# Patient Record
Sex: FEMALE | Race: WHITE | NOT HISPANIC OR LATINO | Employment: UNEMPLOYED | URBAN - METROPOLITAN AREA
[De-identification: names, ages, dates, MRNs, and addresses within clinical notes are randomized per-mention and may not be internally consistent; named-entity substitution may affect disease eponyms.]

---

## 2017-03-21 ENCOUNTER — HOSPITAL ENCOUNTER (OUTPATIENT)
Dept: MAMMOGRAPHY | Facility: HOSPITAL | Age: 47
Discharge: HOME | End: 2017-03-21
Payer: COMMERCIAL

## 2017-03-21 DIAGNOSIS — Z12.31 ENCOUNTER FOR SCREENING MAMMOGRAM FOR MALIGNANT NEOPLASM OF BREAST: ICD-10-CM

## 2017-03-21 PROCEDURE — G0202 SCR MAMMO BI INCL CAD: HCPCS

## 2017-08-28 ENCOUNTER — ALLSCRIPTS OFFICE VISIT (OUTPATIENT)
Dept: OTHER | Facility: OTHER | Age: 47
End: 2017-08-28

## 2018-01-13 VITALS
BODY MASS INDEX: 39.27 KG/M2 | SYSTOLIC BLOOD PRESSURE: 138 MMHG | HEIGHT: 60 IN | WEIGHT: 200 LBS | DIASTOLIC BLOOD PRESSURE: 82 MMHG

## 2018-01-15 NOTE — MISCELLANEOUS
Message   Recorded as Task   Date: 10/14/2016 12:28 PM, Created By: Hafsa Sloan   Task Name: Call Back   Assigned To: [facility],Team   Regarding Patient: Luis Ellison, Status: In Progress   Darlene Hinkle - 14 Oct 2016 12:28 PM     TASK CREATED  Caller: Self; [phone] (Home)  pt called - she said her periods have been abnormal and havent been coming the same time every month  she was told by dr Margarita Gay to call if she noticed a change in her cycle  please advise [phone]   [facility] - 14 Oct 2016 12:33 PM     TASK IN PROGRESS   Yane Simmons - 14 Oct 2016 12:39 PM     TASK EDITED   spoke with pt    cycles have been irreg, but over 21 days    his month was 24    adv to keep a calendar and to call if < 21 days        Active Problems    1  Dense breasts (793 82) (R92 2)   2  Encounter for gynecological examination without abnormal finding (V72 31) (Z01 419)   3  Encounter for routine gynecological examination (V72 31) (Z01 419)   4  Encounter for screening mammogram for malignant neoplasm of breast (V76 12)   (Z12 31)   5  Screening for human papillomavirus (HPV) (V73 81) (Z11 51)    Current Meds   1  Janumet  MG Oral Tablet; TAKE 1 TABLET DAILY WITH FOOD; Therapy: (Recorded:[id number]) to Recorded   2  Lisinopril-Hydrochlorothiazide 20-12 5 MG Oral Tablet; TAKE 1 TABLET DAILY; Therapy: (Recorded:[id number]) to Recorded   3  Pravastatin Sodium 10 MG Oral Tablet; TAKE 1 TABLET DAILY; Therapy: (Recorded:[id number]) to Recorded   4  Protonix 40 MG Oral Tablet Delayed Release (Pantoprazole Sodium); TAKE 1 TABLET   DAILY; Therapy: (Recorded:[id number]) to Recorded    Allergies    1   No Known Drug Allergies    Signatures   Electronically signed by : Lorraine Dalal, ; Oct 14 2016 12:39PM EST                       (Author)

## 2018-01-16 NOTE — PROGRESS NOTES
Assessment    1  Encounter for gynecological examination without abnormal finding (V72 31) (Z01 419)    Plan  Encounter for screening mammogram for malignant neoplasm of breast    · * MAMMO SCREENING BILATERAL W CAD; Status:Hold For - Scheduling; Requested  for:05Mar2017;    Perform:[facility]; WFL:[id number]; Ordered;  For:Encounter for screening mammogram for malignant neoplasm of breast; Ordered By:Hasmukh Gordon;    Discussion/Summary  health maintenance visit Currently, she eats an adequate diet and has an adequate exercise regimen  cervical cancer screening is needed every three years next cervical cancer screening is due 2018 Breast cancer screening: monthly self breast exam was advised, mammogram has been ordered and mammogram is needed every year  Advice and education were given regarding weight bearing exercise and reproductive health  Patient discussion: discussed with the patient  Annual examination was completed  Mammogram was ordered  We discussed patient's calling me after her menses if she still has some pelvic cramping as of in one to order a pelvic ultrasound  Patient otherwise to return to the office in one year  Chief Complaint  annual gyn exam  recent pelvic cramp      History of Present Illness  HPI: Patient returns for annual GYN visit  She has no new medical or surgical issues  She did note in this last few days she has had some increased pelvic cramping  She notes that she is due for her menses  She has done well with an exercise routine and has done very well with control of her diabetes  GYN HM, Adult Female [facility]: The patient is being seen for a gynecology evaluation  The last health maintenance visit was 1 year(s) ago  General Health:   Lifestyle:  She consumes a diverse and healthy diet  She does not have any weight concerns  She exercises regularly  She exercises 3 or more times per week  Exercise includes walking   She does not use tobacco  The patient has never smoked cigarettes  She consumes alcohol  She reports occasional alcohol use  Reproductive health: the patient is premenopausal   she reports no menstrual problems  she uses contraception  For contraception, she uses natural family planning  she is sexually active  She is monogamous with a male partner  Screening: Cervical cancer screening includes a pap smear performed 8/5/15 and human papilloma virus screening performed 8/5/15  Breast cancer screening includes a mammogram performed 3/9/16 and irregular breast self-exams performed  Colorectal cancer screening includes no previous colonoscopy  Review of Systems    Constitutional: No fever, no chills, feels well, no tiredness, no recent weight gain or loss  ENT: no ear ache, no loss of hearing, no nosebleeds or nasal discharge, no sore throat or hoarseness  Cardiovascular: no complaints of slow or fast heart rate, no chest pain, no palpitations, no leg claudication or lower extremity edema  Respiratory: no complaints of shortness of breath, no wheezing, no dyspnea on exertion, no orthopnea or PND  Breasts: no complaints of breast pain, breast lump or nipple discharge  Gastrointestinal: no complaints of abdominal pain, no constipation, no nausea or diarrhea, no vomiting, no bloody stools  Genitourinary: as noted in HPI  Musculoskeletal: no complaints of arthralgia, no myalgia, no joint swelling or stiffness, no limb pain or swelling  Integumentary: no complaints of skin rash or lesion, no itching or dry skin, no skin wounds  Neurological: no complaints of headache, no confusion, no numbness or tingling, no dizziness or fainting  OB History  Pregnancy History (Brief):   Prior pregnancies: : 1  Para: 1 (living)       Active Problems    1  Dense breasts (793 82) (R92 2)   2  Encounter for routine gynecological examination (V7 31) (Z01 419)   3   Encounter for screening mammogram for malignant neoplasm of breast (V7 12) (Z12 31)   4  Screening for human papillomavirus (HPV) (V73 81) (Z11 51)    Past Medical History    · History of Asthma (493 90) (J45 909)   · History of Common migraine without aura (346 10) (G43 009)   · History of eczema (V13 3) (Z87 2)   · History of heartburn (V12 79) (X73 381)   · History of hypertension (V12 59) (Z86 79)   · History of Ovarian cyst (620 2) (N83 20)    Surgical History    · History of  Section    Family History  Mother    · Family history of Diabetes Mellitus (V18 0)   · Family history of Heart Disease (V17 49)  Paternal Grandmother    · Family history of Lung Cancer (V16 1)  Family History    · Family history of Diabetes Mellitus (V18 0)   · Family history of Heart Disease (V17 49)    Social History    · Never A Smoker    Current Meds   1  Janumet  MG Oral Tablet; TAKE 1 TABLET DAILY WITH FOOD; Therapy: (Recorded:[id number]) to Recorded   2  Lisinopril-Hydrochlorothiazide 20-12 5 MG Oral Tablet; TAKE 1 TABLET DAILY; Therapy: (Recorded:[id number]) to Recorded   3  Pravastatin Sodium 10 MG Oral Tablet; TAKE 1 TABLET DAILY; Therapy: (Recorded:[id number]) to Recorded   4  Protonix 40 MG Oral Tablet Delayed Release; TAKE 1 TABLET DAILY; Therapy: (Recorded:[id number]) to Recorded    Allergies    1  No Known Drug Allergies    Vitals   Recorded: 48FJJ5725 93:91MN   Systolic 300, RLE, Sitting   Diastolic 74, RLE, Sitting   LMP 19Meo6953   Height 5 ft 0 25 in   Weight 184 lb    BMI Calculated 35 64   BSA Calculated 1 81     Physical Exam    Constitutional   General appearance: No acute distress, well appearing and well nourished  Neck   Neck: Normal, supple, trachea midline, no masses  Thyroid: Normal, no thyromegaly  Pulmonary   Respiratory effort: No increased work of breathing or signs of respiratory distress  Cardiovascular   Peripheral vascular exam: Normal pulses Throughout  Genitourinary   External genitalia: Normal and no lesions appreciated      Vagina: Normal, no lesions or dryness appreciated  Urethra: Normal     Urethral meatus: Normal     Bladder: Normal, soft, non-tender and no prolapse or masses appreciated  Cervix: Normal, no palpable masses  Uterus: Normal, non-tender, not enlarged, and no palpable masses  Adnexa/parametria: Normal, non-tender and no fullness or masses appreciated  Chest   Breasts: Normal and no dimpling or skin changes noted  Abdomen   Abdomen: Normal, non-tender, and no organomegaly noted  Liver and spleen: No hepatomegaly or splenomegaly  Examination for hernias: No hernias appreciated  Lymphatic   Palpation of lymph nodes in neck, axillae, groin and/or other locations: No lymphadenopathy or masses noted  Skin   Skin and subcutaneous tissue: Normal skin turgor and no rashes      Palpation of skin and subcutaneous tissue: Normal     Psychiatric   Orientation to person, place, and time: Normal     Mood and affect: Normal        Signatures   Electronically signed by : Reshma Junior DO; Aug 24 2016 12:16PM EST                       (Author)

## 2018-03-21 DIAGNOSIS — Z12.31 ENCOUNTER FOR SCREENING MAMMOGRAM FOR MALIGNANT NEOPLASM OF BREAST: ICD-10-CM

## 2018-07-23 ENCOUNTER — TELEPHONE (OUTPATIENT)
Dept: OBGYN CLINIC | Facility: CLINIC | Age: 48
End: 2018-07-23

## 2018-07-23 DIAGNOSIS — N76.0 ACUTE VAGINITIS: Primary | ICD-10-CM

## 2018-07-23 NOTE — TELEPHONE ENCOUNTER
Patient called, she started on Friday evening with vaginal burning, itching, swelling and pain  Slight white discharge, no foul odor  She bought a 7 day monistat, used day 3 last night and has no improvement  Please advise, do you want to treat or appointment   She uses [facility] on [address] in [facility]

## 2018-09-04 ENCOUNTER — ANNUAL EXAM (OUTPATIENT)
Dept: OBGYN CLINIC | Facility: CLINIC | Age: 48
End: 2018-09-04
Payer: COMMERCIAL

## 2018-09-04 VITALS
HEIGHT: 60 IN | WEIGHT: 194 LBS | BODY MASS INDEX: 38.09 KG/M2 | SYSTOLIC BLOOD PRESSURE: 112 MMHG | DIASTOLIC BLOOD PRESSURE: 62 MMHG

## 2018-09-04 DIAGNOSIS — Z12.31 ENCOUNTER FOR SCREENING MAMMOGRAM FOR MALIGNANT NEOPLASM OF BREAST: ICD-10-CM

## 2018-09-04 DIAGNOSIS — Z01.419 ENCOUNTER FOR GYNECOLOGICAL EXAMINATION (GENERAL) (ROUTINE) WITHOUT ABNORMAL FINDINGS: Primary | ICD-10-CM

## 2018-09-04 DIAGNOSIS — Z11.51 SCREENING FOR HUMAN PAPILLOMAVIRUS (HPV): ICD-10-CM

## 2018-09-04 PROCEDURE — S0612 ANNUAL GYNECOLOGICAL EXAMINA: HCPCS | Performed by: OBSTETRICS & GYNECOLOGY

## 2018-09-04 PROCEDURE — G0145 SCR C/V CYTO,THINLAYER,RESCR: HCPCS | Performed by: OBSTETRICS & GYNECOLOGY

## 2018-09-04 PROCEDURE — 87624 HPV HI-RISK TYP POOLED RSLT: CPT | Performed by: OBSTETRICS & GYNECOLOGY

## 2018-09-04 RX ORDER — CLINDAMYCIN PHOSPHATE 10 UG/ML
LOTION TOPICAL
Refills: 2 | COMMUNITY
Start: 2018-06-28

## 2018-09-04 RX ORDER — LANCETS 33 GAUGE
EACH MISCELLANEOUS
COMMUNITY
Start: 2016-11-04

## 2018-09-04 RX ORDER — ALBUTEROL SULFATE 90 UG/1
2 AEROSOL, METERED RESPIRATORY (INHALATION)
COMMUNITY
Start: 2015-10-01

## 2018-09-04 RX ORDER — LISINOPRIL AND HYDROCHLOROTHIAZIDE 20; 12.5 MG/1; MG/1
TABLET ORAL
Refills: 3 | COMMUNITY
Start: 2018-08-02

## 2018-09-04 RX ORDER — PANTOPRAZOLE SODIUM 40 MG/1
TABLET, DELAYED RELEASE ORAL
Refills: 5 | COMMUNITY
Start: 2018-08-13

## 2018-09-04 RX ORDER — GLIPIZIDE 5 MG/1
TABLET, FILM COATED, EXTENDED RELEASE ORAL
Refills: 5 | COMMUNITY
Start: 2018-08-02

## 2018-09-04 NOTE — PROGRESS NOTES
Assessment/Plan:    No problem-specific Assessment & Plan notes found for this encounter  Diagnoses and all orders for this visit:    Encounter for gynecological examination (general) (routine) without abnormal findings  -     Liquid-based pap, screening    Encounter for screening mammogram for malignant neoplasm of breast  -     Mammo screening bilateral w cad; Future    Screening for human papillomavirus (HPV)  -     Liquid-based pap, screening    Other orders  -     albuterol (PROVENTIL HFA,VENTOLIN HFA) 90 mcg/act inhaler; Inhale 2 puffs  -     clindamycin (CLEOCIN T) 1 % lotion; SEAN TO FACE Q EVENING  -     glipiZIDE (GLUCOTROL XL) 5 mg 24 hr tablet; TK 1 T PO D WITH NICHELLE  -     lisinopril-hydrochlorothiazide (PRINZIDE,ZESTORETIC) 20-12 5 MG per tablet; TK 1 T PO D  -     [facility] UNILET LANCETS 33G MISC; One touch Delica; Testing 1-2 times per daily  -     metFORMIN (GLUCOPHAGE) 850 mg tablet; TK 1 T PO BID WC  -     glucose blood test strip; Testing 1-2 times per day  -     pantoprazole (PROTONIX) 40 mg tablet; TK 1 T PO  QD        Annual examination was completed  Mammogram was ordered  Pap with HPV testing was obtained  Patient to return to the office in 1 year or as necessary  Subjective:      Patient ID: Misty Phillip is a 53 y o  female  Patient returns for annual gyn visit  She has no new medical surgical issues  Menses are well timed and normal in flow  She has noted no further yeast infections  She is slightly teary today as her son began 6 grade today  She notes good glycemic control  Gynecologic Exam         The following portions of the patient's history were reviewed and updated as appropriate:   She  has a past medical history of Asthma; Common migraine without aura; Eczema;  Hypertension; and Ovarian cyst   She   Patient Active Problem List    Diagnosis Date Noted    Encounter for gynecological examination (general) (routine) without abnormal findings 09/04/2018    Encounter for screening mammogram for malignant neoplasm of breast 2018    Screening for human papillomavirus (HPV) 2018     She  has a past surgical history that includes  section  Her family history includes Diabetes in her family and mother; Heart disease in her family and mother; Lung cancer in her paternal grandmother  She  reports that she has never smoked  She has never used smokeless tobacco  She reports that she does not drink alcohol or use drugs  Current Outpatient Prescriptions   Medication Sig Dispense Refill    albuterol (PROVENTIL HFA,VENTOLIN HFA) 90 mcg/act inhaler Inhale 2 puffs      clindamycin (CLEOCIN T) 1 % lotion SEAN TO FACE Q EVENING  2    [facility] [facility] UNILET LANCETS 33G MISC One touch Delica; Testing 1-2 times per daily      glipiZIDE (GLUCOTROL XL) 5 mg 24 hr tablet TK 1 T PO D WITH NICHELLE  5    glucose blood test strip Testing 1-2 times per day      lisinopril-hydrochlorothiazide (PRINZIDE,ZESTORETIC) 20-12 5 MG per tablet TK 1 T PO D  3    metFORMIN (GLUCOPHAGE) 850 mg tablet TK 1 T PO BID WC  5    pantoprazole (PROTONIX) 40 mg tablet TK 1 T PO  QD  5     No current facility-administered medications for this visit  Current Outpatient Prescriptions on File Prior to Visit   Medication Sig    [DISCONTINUED] terconazole (TERAZOL 3) 0 8 % vaginal cream Insert 1 applicator into the vagina daily at bedtime     No current facility-administered medications on file prior to visit  She has No Known Allergies       Review of Systems   All other systems reviewed and are negative  Objective:      /62   Ht 4' 11 5" (1 511 m)   Wt 88 kg (194 lb)   LMP 2018 (Exact Date)   BMI 38 53 kg/m²          Physical Exam   Constitutional: She is oriented to person, place, and time  She appears well-developed and well-nourished  HENT:   Head: Normocephalic and atraumatic     Nose: Nose normal    Eyes: EOM are normal  Pupils are equal, round, and reactive to light    Neck: Normal range of motion  Neck supple  No thyromegaly present  Cardiovascular: Normal rate and regular rhythm  Pulmonary/Chest: Effort normal and breath sounds normal  No respiratory distress  Breasts no masses, tenderness, skin changes   Abdominal: Soft  Bowel sounds are normal  She exhibits no distension and no mass  There is no tenderness  Hernia confirmed negative in the right inguinal area and confirmed negative in the left inguinal area  Genitourinary: Vagina normal and uterus normal  No breast swelling, tenderness, discharge or bleeding  Pelvic exam was performed with patient supine  No labial fusion  There is no rash, tenderness, lesion or injury on the right labia  There is no rash, tenderness, lesion or injury on the left labia  Cervix exhibits no motion tenderness, no discharge and no friability  Genitourinary Comments: Ext genitalia nl female w/o lesions  Vag healthy without lesions or discharge  Cervix healthy w/o lesions or discharge  uterus nl size, NT, no mass  Adnexa NT, no mass   Musculoskeletal: Normal range of motion  She exhibits no edema  Lymphadenopathy:        Right: No inguinal adenopathy present  Left: No inguinal adenopathy present  Neurological: She is alert and oriented to person, place, and time  She has normal reflexes  Skin: Skin is warm and dry  No rash noted  Psychiatric: She has a normal mood and affect  Her behavior is normal  Thought content normal    Nursing note and vitals reviewed

## 2018-09-07 LAB
HPV HR 12 DNA CVX QL NAA+PROBE: NEGATIVE
HPV16 DNA CVX QL NAA+PROBE: NEGATIVE
HPV18 DNA CVX QL NAA+PROBE: NEGATIVE
LAB AP GYN PRIMARY INTERPRETATION: NORMAL
Lab: NORMAL

## 2018-09-11 ENCOUNTER — HOSPITAL ENCOUNTER (OUTPATIENT)
Dept: MAMMOGRAPHY | Facility: HOSPITAL | Age: 48
Discharge: HOME | End: 2018-09-11
Attending: OBSTETRICS & GYNECOLOGY
Payer: COMMERCIAL

## 2018-09-11 DIAGNOSIS — Z12.31 ENCOUNTER FOR SCREENING MAMMOGRAM FOR MALIGNANT NEOPLASM OF BREAST: ICD-10-CM

## 2018-09-11 PROCEDURE — 77067 SCR MAMMO BI INCL CAD: CPT

## 2019-08-27 ENCOUNTER — TELEPHONE (OUTPATIENT)
Dept: OBGYN CLINIC | Facility: CLINIC | Age: 49
End: 2019-08-27

## 2019-08-27 ENCOUNTER — TRANSCRIBE ORDERS (OUTPATIENT)
Dept: ADMINISTRATIVE | Facility: HOSPITAL | Age: 49
End: 2019-08-27

## 2019-08-27 DIAGNOSIS — Z12.39 BREAST SCREENING, UNSPECIFIED: Primary | ICD-10-CM

## 2019-08-27 DIAGNOSIS — Z12.31 ENCOUNTER FOR SCREENING MAMMOGRAM FOR MALIGNANT NEOPLASM OF BREAST: Primary | ICD-10-CM

## 2019-09-05 ENCOUNTER — HOSPITAL ENCOUNTER (OUTPATIENT)
Dept: MAMMOGRAPHY | Facility: HOSPITAL | Age: 49
Discharge: HOME | End: 2019-09-05
Attending: OBSTETRICS & GYNECOLOGY
Payer: COMMERCIAL

## 2019-09-05 VITALS — WEIGHT: 194 LBS | BODY MASS INDEX: 38.09 KG/M2 | HEIGHT: 60 IN

## 2019-09-05 DIAGNOSIS — Z12.31 ENCOUNTER FOR SCREENING MAMMOGRAM FOR MALIGNANT NEOPLASM OF BREAST: ICD-10-CM

## 2019-09-05 PROCEDURE — 77067 SCR MAMMO BI INCL CAD: CPT

## 2019-09-09 ENCOUNTER — ANNUAL EXAM (OUTPATIENT)
Dept: OBGYN CLINIC | Facility: CLINIC | Age: 49
End: 2019-09-09
Payer: COMMERCIAL

## 2019-09-09 VITALS
HEIGHT: 60 IN | WEIGHT: 195 LBS | DIASTOLIC BLOOD PRESSURE: 80 MMHG | BODY MASS INDEX: 38.28 KG/M2 | SYSTOLIC BLOOD PRESSURE: 138 MMHG

## 2019-09-09 DIAGNOSIS — Z12.39 SCREENING FOR MALIGNANT NEOPLASM OF BREAST: ICD-10-CM

## 2019-09-09 DIAGNOSIS — Z01.419 ENCOUNTER FOR GYNECOLOGICAL EXAMINATION (GENERAL) (ROUTINE) WITHOUT ABNORMAL FINDINGS: Primary | ICD-10-CM

## 2019-09-09 PROCEDURE — 99396 PREV VISIT EST AGE 40-64: CPT | Performed by: OBSTETRICS & GYNECOLOGY

## 2019-09-09 NOTE — PROGRESS NOTES
Assessment/Plan:    No problem-specific Assessment & Plan notes found for this encounter  Diagnoses and all orders for this visit:    Screening for malignant neoplasm of breast  -     Mammo screening bilateral w cad; Future    Encounter for gynecological examination (general) (routine) without abnormal findings        Annual examination was completed  Mammogram was ordered  Patient to return to the office in 1 year or as necessary  Subjective:      Patient ID: Pratik Sinha is a 51 y o  female  Patient returns for annual gyn visit  Since she was last seen she has no new medical surgical issues  She continues to have appropriately timed menses that are normal in flow  She has reasonably good control of her diabetes  She has not had significant issues with UTIs or yeast infections  Her son is thriving and is now age 15  The following portions of the patient's history were reviewed and updated as appropriate:   She  has a past medical history of Asthma, Common migraine without aura, Eczema, Hypertension, and Ovarian cyst   She   Patient Active Problem List    Diagnosis Date Noted    Screening for malignant neoplasm of breast 2019    Encounter for gynecological examination (general) (routine) without abnormal findings 2018    Encounter for screening mammogram for malignant neoplasm of breast 2018    Screening for human papillomavirus (HPV) 2018     She  has a past surgical history that includes  section  Her family history includes Breast cancer in her maternal aunt and maternal aunt; Diabetes in her family and mother; Heart disease in her family and mother; Lung cancer in her paternal grandmother  She  reports that she has never smoked  She has never used smokeless tobacco  She reports that she does not drink alcohol or use drugs    Current Outpatient Medications   Medication Sig Dispense Refill    albuterol (PROVENTIL HFA,VENTOLIN HFA) 90 mcg/act inhaler Inhale 2 puffs      [employer] [facility] UNILET LANCETS 33G MISC One touch Delica; Testing 1-2 times per daily      glipiZIDE (GLUCOTROL XL) 5 mg 24 hr tablet TK 1 T PO D WITH NICHELLE  5    glucose blood test strip Testing 1-2 times per day      lisinopril-hydrochlorothiazide (PRINZIDE,ZESTORETIC) 20-12 5 MG per tablet TK 1 T PO D  3    metFORMIN (GLUCOPHAGE) 850 mg tablet TK 1 T PO BID WC  5    pantoprazole (PROTONIX) 40 mg tablet TK 1 T PO  QD  5    clindamycin (CLEOCIN T) 1 % lotion SEAN TO FACE Q EVENING  2     No current facility-administered medications for this visit  Current Outpatient Medications on File Prior to Visit   Medication Sig    albuterol (PROVENTIL HFA,VENTOLIN HFA) 90 mcg/act inhaler Inhale 2 puffs    [employer] [facility]ART UNILET LANCETS 33G MISC One touch Delica; Testing 1-2 times per daily    glipiZIDE (GLUCOTROL XL) 5 mg 24 hr tablet TK 1 T PO D WITH NICHELLE    glucose blood test strip Testing 1-2 times per day    lisinopril-hydrochlorothiazide (PRINZIDE,ZESTORETIC) 20-12 5 MG per tablet TK 1 T PO D    metFORMIN (GLUCOPHAGE) 850 mg tablet TK 1 T PO BID WC    pantoprazole (PROTONIX) 40 mg tablet TK 1 T PO  QD    clindamycin (CLEOCIN T) 1 % lotion SEAN TO FACE Q EVENING     No current facility-administered medications on file prior to visit  She has No Known Allergies       Review of Systems   All other systems reviewed and are negative  Objective:      /80 (BP Location: Left arm, Patient Position: Sitting, Cuff Size: Standard)   Ht 5' (1 524 m)   Wt 88 5 kg (195 lb)   LMP 08/30/2019   BMI 38 08 kg/m²          Physical Exam   Constitutional: She is oriented to person, place, and time  She appears well-developed and well-nourished  HENT:   Head: Normocephalic and atraumatic  Nose: Nose normal    Eyes: Pupils are equal, round, and reactive to light  EOM are normal    Neck: Normal range of motion  Neck supple  No thyromegaly present     Pulmonary/Chest: Effort normal  No respiratory distress  No breast tenderness, discharge or bleeding  Breasts no masses, tenderness, skin changes   Abdominal: Soft  She exhibits no distension and no mass  There is no tenderness  Hernia confirmed negative in the right inguinal area and confirmed negative in the left inguinal area  Genitourinary: Vagina normal and uterus normal  No breast tenderness, discharge or bleeding  Pelvic exam was performed with patient supine  No labial fusion  There is no rash, tenderness, lesion or injury on the right labia  There is no rash, tenderness, lesion or injury on the left labia  Cervix exhibits no motion tenderness, no discharge and no friability  Genitourinary Comments: Ext genitalia nl female w/o lesions  Vag healthy without lesions or discharge  Cervix healthy w/o lesions or discharge  uterus nl size, NT, no mass  Adnexa NT, no mass   Musculoskeletal: Normal range of motion  She exhibits no edema  Lymphadenopathy:        Right: No inguinal adenopathy present  Left: No inguinal adenopathy present  Neurological: She is alert and oriented to person, place, and time  She has normal reflexes  Skin: Skin is warm and dry  No rash noted  Psychiatric: She has a normal mood and affect  Her behavior is normal  Thought content normal    Nursing note and vitals reviewed

## 2020-12-03 ENCOUNTER — ANNUAL EXAM (OUTPATIENT)
Dept: OBGYN CLINIC | Facility: CLINIC | Age: 50
End: 2020-12-03
Payer: COMMERCIAL

## 2020-12-03 VITALS
HEIGHT: 61 IN | WEIGHT: 190.6 LBS | DIASTOLIC BLOOD PRESSURE: 86 MMHG | SYSTOLIC BLOOD PRESSURE: 130 MMHG | BODY MASS INDEX: 35.98 KG/M2

## 2020-12-03 DIAGNOSIS — Z01.419 ENCOUNTER FOR GYNECOLOGICAL EXAMINATION (GENERAL) (ROUTINE) WITHOUT ABNORMAL FINDINGS: ICD-10-CM

## 2020-12-03 DIAGNOSIS — Z12.31 ENCOUNTER FOR SCREENING MAMMOGRAM FOR MALIGNANT NEOPLASM OF BREAST: ICD-10-CM

## 2020-12-03 DIAGNOSIS — Z12.11 ENCOUNTER FOR SCREENING COLONOSCOPY: Primary | ICD-10-CM

## 2020-12-03 PROCEDURE — 99396 PREV VISIT EST AGE 40-64: CPT | Performed by: STUDENT IN AN ORGANIZED HEALTH CARE EDUCATION/TRAINING PROGRAM

## 2020-12-03 RX ORDER — OMEPRAZOLE 10 MG/1
10 CAPSULE, DELAYED RELEASE ORAL DAILY
COMMUNITY

## 2023-03-24 ENCOUNTER — ANNUAL EXAM (OUTPATIENT)
Dept: OBGYN CLINIC | Facility: CLINIC | Age: 53
End: 2023-03-24

## 2023-03-24 VITALS
HEIGHT: 59 IN | WEIGHT: 184.8 LBS | DIASTOLIC BLOOD PRESSURE: 78 MMHG | BODY MASS INDEX: 37.25 KG/M2 | SYSTOLIC BLOOD PRESSURE: 128 MMHG

## 2023-03-24 DIAGNOSIS — Z11.51 SCREENING FOR HUMAN PAPILLOMAVIRUS (HPV): ICD-10-CM

## 2023-03-24 DIAGNOSIS — Z12.31 ENCOUNTER FOR SCREENING MAMMOGRAM FOR MALIGNANT NEOPLASM OF BREAST: ICD-10-CM

## 2023-03-24 DIAGNOSIS — Z01.419 ENCOUNTER FOR ANNUAL ROUTINE GYNECOLOGICAL EXAMINATION: Primary | ICD-10-CM

## 2023-03-24 DIAGNOSIS — Z12.11 ENCOUNTER FOR SCREENING FOR MALIGNANT NEOPLASM OF COLON: ICD-10-CM

## 2023-03-24 PROBLEM — I10 PRIMARY HYPERTENSION: Status: ACTIVE | Noted: 2023-03-24

## 2023-03-24 PROBLEM — Z12.39 SCREENING FOR MALIGNANT NEOPLASM OF BREAST: Status: RESOLVED | Noted: 2019-09-09 | Resolved: 2023-03-24

## 2023-03-24 NOTE — PROGRESS NOTES
Assessment/Plan:    1  Encounter for annual routine gynecological examination    - IGP, Aptima HPV, Rfx 16/18,45    2  Encounter for screening mammogram for malignant neoplasm of breast    - Mammo screening bilateral w 3d & cad; Future    3  Screening for human papillomavirus (HPV)    - IGP, Aptima HPV, Rfx 16/18,45    4  Encounter for screening for malignant neoplasm of colon    - Ambulatory referral to Gastroenterology; Future        Subjective      Baron Valerio is a 47 y o  female who presents for annual exam  Cyclic symptoms include hot flashes and night sweats  No intermenstrual bleeding, spotting, or discharge  The patient denies any urinary or sexual concerns  Current contraception: rhythm method  History of abnormal Pap smear: no  Regular self breast exam: yes  History of abnormal mammogram: no  History of abnormal lipids: yes     Menstrual History:  OB History        1    Para   1    Term   0            AB        Living   1       SAB        IAB        Ectopic        Multiple        Live Births                      Patient's last menstrual period was 2023 (within days)    Period Duration (Days): 1-2  Period Pattern: (!) Irregular  Menstrual Flow: Light  Menstrual Control: Panty liner  Dysmenorrhea: None    Past Medical History:   Diagnosis Date   • Asthma    • Common migraine without aura    • Diabetes mellitus ([address] )    • Eczema    • Gestational diabetes    • Hypertension    • Ovarian cyst        Family History   Problem Relation Age of Onset   • Diabetes Mother         mellitus   • Heart disease Mother    • Lung cancer Paternal Grandmother    • Diabetes Family         mellitus   • Heart disease Family    • Diabetes Father    • Breast cancer Maternal Aunt    • Breast cancer Maternal Aunt        The following portions of the patient's history were reviewed and updated as appropriate: allergies, current medications, past family history, past medical history, past social history, past surgical history and problem list     Review of Systems  Pertinent items are noted in HPI  Objective      /78 (BP Location: Right arm, Patient Position: Sitting, Cuff Size: Large)   Ht 4' 11" (1 499 m)   Wt 83 8 kg (184 lb 12 8 oz)   LMP 02/06/2023 (Within Days)   BMI 37 33 kg/m²     General:   alert and oriented, in no acute distress   Heart:  Breasts: regular rate and rhythm   appear normal, no suspicious masses, no skin or nipple changes or axillary nodes     Lungs: effort normal   Abdomen: soft, non-tender, without masses or organomegaly   Vulva: normal   Vagina: normal mucosa   Cervix: no lesions   Uterus: normal size, mobile, non-tender   Adnexa: normal adnexa and no mass, fullness, tenderness

## 2023-03-29 LAB
CYTOLOGIST CVX/VAG CYTO: NORMAL
DX ICD CODE: NORMAL
HPV GENOTYPE REFLEX: NORMAL
HPV I/H RISK 4 DNA CVX QL PROBE+SIG AMP: NEGATIVE
OTHER STN SPEC: NORMAL
PATH REPORT.FINAL DX SPEC: NORMAL
SL AMB NOTE:: NORMAL
SL AMB SPECIMEN ADEQUACY: NORMAL
SL AMB TEST METHODOLOGY: NORMAL

## 2023-05-31 ENCOUNTER — HOSPITAL ENCOUNTER (OUTPATIENT)
Dept: MAMMOGRAPHY | Facility: MEDICAL CENTER | Age: 53
Discharge: HOME | End: 2023-05-31

## 2023-05-31 VITALS — HEIGHT: 59 IN | WEIGHT: 184.75 LBS | BODY MASS INDEX: 37.24 KG/M2

## 2023-05-31 DIAGNOSIS — Z12.31 ENCOUNTER FOR SCREENING MAMMOGRAM FOR MALIGNANT NEOPLASM OF BREAST: ICD-10-CM

## 2024-02-21 PROBLEM — Z11.51 SCREENING FOR HUMAN PAPILLOMAVIRUS (HPV): Status: RESOLVED | Noted: 2018-09-04 | Resolved: 2024-02-21

## 2024-08-19 ENCOUNTER — ANNUAL EXAM (OUTPATIENT)
Dept: OBGYN CLINIC | Facility: CLINIC | Age: 54
End: 2024-08-19
Payer: COMMERCIAL

## 2024-08-19 VITALS
DIASTOLIC BLOOD PRESSURE: 96 MMHG | WEIGHT: 184.2 LBS | BODY MASS INDEX: 37.13 KG/M2 | HEIGHT: 59 IN | SYSTOLIC BLOOD PRESSURE: 158 MMHG

## 2024-08-19 DIAGNOSIS — Z12.31 ENCOUNTER FOR SCREENING MAMMOGRAM FOR MALIGNANT NEOPLASM OF BREAST: ICD-10-CM

## 2024-08-19 DIAGNOSIS — Z12.11 SCREENING FOR COLON CANCER: ICD-10-CM

## 2024-08-19 DIAGNOSIS — N92.6 IRREGULAR MENSES: ICD-10-CM

## 2024-08-19 DIAGNOSIS — I10 PRIMARY HYPERTENSION: ICD-10-CM

## 2024-08-19 DIAGNOSIS — Z01.411 ENCNTR FOR GYN EXAM (GENERAL) (ROUTINE) W ABNORMAL FINDINGS: Primary | ICD-10-CM

## 2024-08-19 PROCEDURE — 99396 PREV VISIT EST AGE 40-64: CPT | Performed by: NURSE PRACTITIONER

## 2024-08-19 RX ORDER — NEOMYCIN SULFATE, POLYMYXIN B SULFATE, HYDROCORTISONE 3.5; 10000; 1 MG/ML; [USP'U]/ML; MG/ML
SOLUTION/ DROPS AURICULAR (OTIC)
COMMUNITY
Start: 2024-07-26

## 2024-08-19 RX ORDER — TRIAMCINOLONE ACETONIDE 1 MG/G
1 CREAM TOPICAL 2 TIMES DAILY
COMMUNITY
Start: 2024-02-29 | End: 2025-02-28

## 2024-08-19 NOTE — PATIENT INSTRUCTIONS
Repeat BP of 158/96 in left arm while sitting upright. Declines follow up with ER. Strongly encouraged to follow up with PCP today.  Pelvic ultrasound ordered. Call to schedule.   Calcium 1200 mg (in divided doses-max 600 mg at one time) + 600-1000 IU Vit D daily.   Exercise 150-300 minutes per week minimum including weight bearing exercises.   Pap with high risk HPV Q 5 years, if normal.  Due 2028  Call your insurance company to verify coverage prior to completing any ordered tests.   Annual mammogram ordered and monthly breast self exam recommended.    Colonoscopy-  Referred placed.         Kegels 20 times twice daily.   Silicone based lubricant with sex. (Use water based lubricant with condoms or sexual toys.)    Vaginal moisturizers twice weekly as needed.   Return to office in one year or sooner, if needed.
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
## 2024-08-28 ENCOUNTER — HOSPITAL ENCOUNTER (OUTPATIENT)
Dept: MAMMOGRAPHY | Facility: MEDICAL CENTER | Age: 54
Discharge: HOME | End: 2024-08-28
Payer: COMMERCIAL

## 2024-08-28 VITALS — HEIGHT: 59 IN | WEIGHT: 184 LBS | BODY MASS INDEX: 37.09 KG/M2

## 2024-08-28 DIAGNOSIS — Z12.31 ENCOUNTER FOR SCREENING MAMMOGRAM FOR MALIGNANT NEOPLASM OF BREAST: ICD-10-CM

## 2024-08-28 PROCEDURE — 77067 SCR MAMMO BI INCL CAD: CPT

## 2024-08-28 PROCEDURE — 77063 BREAST TOMOSYNTHESIS BI: CPT
